# Patient Record
Sex: FEMALE | Race: AMERICAN INDIAN OR ALASKA NATIVE
[De-identification: names, ages, dates, MRNs, and addresses within clinical notes are randomized per-mention and may not be internally consistent; named-entity substitution may affect disease eponyms.]

---

## 2019-10-17 ENCOUNTER — HOSPITAL ENCOUNTER (EMERGENCY)
Dept: HOSPITAL 5 - ED | Age: 29
Discharge: HOME | End: 2019-10-17
Payer: SELF-PAY

## 2019-10-17 VITALS — SYSTOLIC BLOOD PRESSURE: 132 MMHG | DIASTOLIC BLOOD PRESSURE: 87 MMHG

## 2019-10-17 DIAGNOSIS — Z79.899: ICD-10-CM

## 2019-10-17 DIAGNOSIS — R21: Primary | ICD-10-CM

## 2019-10-17 DIAGNOSIS — E66.9: ICD-10-CM

## 2019-10-17 PROCEDURE — 99282 EMERGENCY DEPT VISIT SF MDM: CPT

## 2024-03-21 NOTE — EMERGENCY DEPARTMENT REPORT
- General


Chief complaint: Skin Rash


Stated complaint: BODY RASH


Time Seen by Provider: 10/17/19 04:54


Source: patient


Mode of arrival: Ambulatory


Limitations: No Limitations





- History of Present Illness


Initial comments: 





Patient is a 29-year-old female presents emergency room with complaints of a 

rash diffusely that began yesterday.  She has associated itching.  She denies 

sleeping in a different place.  Denies anyone else with the same rash.  She 

denies any new soaps, detergents, lotions, medications, foods.  Denies any 

drainage or fever.  she denies any SOB, difficulty swallowing, sensation of 

throat closing. Patient denies any past medical history or allergies 

medications.  States her last menstrual cycle was 10/11/19.





- Related Data


                                  Previous Rx's











 Medication  Instructions  Recorded  Last Taken  Type


 


Hydrocortisone [Hydrocortisone 1 applicatio TP TID #1 oint...g. 10/17/19 Unknown

 Rx





2.5% OINT]    


 


diphenhydrAMINE [Benadryl CAP] 25 mg PO Q6HR PRN #14 capsule 10/17/19 Unknown Rx











                                    Allergies











Allergy/AdvReac Type Severity Reaction Status Date / Time


 


No Known Allergies Allergy   Unverified 10/17/19 04:46














Abscess Boil HPI





- HPI


Chief Complaint: Skin Rash


Stated Complaint: BODY RASH


Time Seen by Provider: 10/17/19 04:54


Home Medications: 


                                  Previous Rx's











 Medication  Instructions  Recorded  Last Taken  Type


 


Hydrocortisone [Hydrocortisone 1 applicatio TP TID #1 oint...g. 10/17/19 Unknown

 Rx





2.5% OINT]    


 


diphenhydrAMINE [Benadryl CAP] 25 mg PO Q6HR PRN #14 capsule 10/17/19 Unknown Rx











Allergies/Adverse Reactions: 


                                    Allergies











Allergy/AdvReac Type Severity Reaction Status Date / Time


 


No Known Allergies Allergy   Unverified 10/17/19 04:46














ED Review of Systems


ROS: 


Stated complaint: BODY RASH


Other details as noted in HPI





Comment: All other systems reviewed and negative





ED Past Medical Hx





- Past Medical History


Previous Medical History?: Yes


Additional medical history: Obesity





- Surgical History


Past Surgical History?: No





- Social History


Smoking Status: Never Smoker


Substance Use Type: None





- Medications


Home Medications: 


                                Home Medications











 Medication  Instructions  Recorded  Confirmed  Last Taken  Type


 


Hydrocortisone [Hydrocortisone 1 applicatio TP TID #1 oint...g. 10/17/19  Unk

nown Rx





2.5% OINT]     


 


diphenhydrAMINE [Benadryl CAP] 25 mg PO Q6HR PRN #14 capsule 10/17/19  Unknown 

Rx














ED Physical Exam





- General


Limitations: No Limitations


General appearance: alert, in no apparent distress





- Head


Head exam: Present: atraumatic, normocephalic





- Eye


Eye exam: Present: normal appearance





- ENT


ENT exam: Present: mucous membranes moist





- Neurological Exam


Neurological exam: Present: alert, oriented X3





- Psychiatric


Psychiatric exam: Present: normal affect, normal mood





- Skin


Skin exam: Present: warm, dry, urticaria (small amount on the right upper arm, 

small amount on the left arm, not present on the back, chest, or abdomen, no 

skin denuding, no scaling, no burrowing, no blistering)





ED Course


                                   Vital Signs











  10/17/19





  04:39


 


Temperature 98.4 F


 


Pulse Rate 94 H


 


Respiratory 16





Rate 


 


Blood Pressure 132/87


 


O2 Sat by Pulse 100





Oximetry 














ED Medical Decision Making





- Medical Decision Making





Patient is a 29-year-old female presents emergency room with complaints of a 

rash diffusely that began yesterday.  She has associated itching.  She denies 

sleeping in a different place.  Denies anyone else with the same rash.  She 

denies any new soaps, detergents, lotions, medications, foods.  Denies any 

drainage or fever.  she denies any SOB, difficulty swallowing, sensation of 

throat closing. Patient denies any past medical history or allergies 

medications.  States her last menstrual cycle was 10/11/19. VSS. on exam: small 

amount of urticaria on the right upper arm, small amount on the left arm, not 

present on the back, chest, or abdomen, no skin denuding, no scaling, no 

burrowing, no blistering.  Patient given prescription for hydrocortisone 

ointment and Benadryl. advised pt to please use medication as prescribed.  Do 

not drive or operate machinery while taking Benadryl due to potential for 

drowsiness.  Avoid scratching.  Follow-up with a primary care doctor in the next

3-5 days.  Return to the emergency room for any new or worsening symptoms.





- Differential Diagnosis


contact/irritant derm, allergic rxn, scabies, bed bugs/insect bite, urticar


Critical care attestation.: 


If time is entered above; I have spent that time in minutes in the direct care 

of this critically ill patient, excluding procedure time.








ED Disposition


Clinical Impression: 


 Rash





Disposition: DC-01 TO HOME OR SELFCARE


Is pt being admited?: No


Does the pt Need Aspirin: No


Condition: Stable


Instructions:  Acute Rash (ED)


Additional Instructions: 


Please use medication as prescribed.  Do not drive or operate machinery while 

taking Benadryl due to potential for drowsiness.  Avoid scratching.  Follow-up 

with a primary care doctor in the next 3-5 days.  Return to the emergency room 

for any new or worsening symptoms.


Prescriptions: 


diphenhydrAMINE [Benadryl CAP] 25 mg PO Q6HR PRN #14 capsule


 PRN Reason: itching


Hydrocortisone [Hydrocortisone 2.5% OINT] 1 applicatio TP TID #1 oint...g.


Referrals: 


[facility] [Provider Group] - 3-5 Days


Time of Disposition: 05:02


Print Language: ENGLISH
No